# Patient Record
Sex: MALE | Race: WHITE | NOT HISPANIC OR LATINO
[De-identification: names, ages, dates, MRNs, and addresses within clinical notes are randomized per-mention and may not be internally consistent; named-entity substitution may affect disease eponyms.]

---

## 2023-05-11 PROBLEM — Z00.00 ENCOUNTER FOR PREVENTIVE HEALTH EXAMINATION: Status: ACTIVE | Noted: 2023-05-11

## 2023-05-18 ENCOUNTER — APPOINTMENT (OUTPATIENT)
Dept: PLASTIC SURGERY | Facility: CLINIC | Age: 33
End: 2023-05-18
Payer: COMMERCIAL

## 2023-05-18 VITALS
DIASTOLIC BLOOD PRESSURE: 102 MMHG | HEART RATE: 76 BPM | HEIGHT: 60.63 IN | BODY MASS INDEX: 29.84 KG/M2 | OXYGEN SATURATION: 95 % | SYSTOLIC BLOOD PRESSURE: 146 MMHG | RESPIRATION RATE: 16 BRPM | WEIGHT: 156 LBS

## 2023-05-18 DIAGNOSIS — F64.0 TRANSSEXUALISM: ICD-10-CM

## 2023-05-18 DIAGNOSIS — F31.70 BIPOLAR DISORDER, CURRENTLY IN REMISSION, MOST RECENT EPISODE UNSPECIFIED: ICD-10-CM

## 2023-05-18 DIAGNOSIS — Z87.890 PERSONAL HISTORY OF SEX REASSIGNMENT: ICD-10-CM

## 2023-05-18 DIAGNOSIS — F64.9 GENDER IDENTITY DISORDER, UNSPECIFIED: ICD-10-CM

## 2023-05-18 DIAGNOSIS — Q54.8: ICD-10-CM

## 2023-05-18 DIAGNOSIS — Z86.59 PERSONAL HISTORY OF OTHER MENTAL AND BEHAVIORAL DISORDERS: ICD-10-CM

## 2023-05-18 DIAGNOSIS — Z78.9 OTHER SPECIFIED HEALTH STATUS: ICD-10-CM

## 2023-05-18 PROCEDURE — 99203 OFFICE O/P NEW LOW 30 MIN: CPT

## 2023-05-20 PROBLEM — Z86.59 HISTORY OF ATTENTION DEFICIT HYPERACTIVITY DISORDER (ADHD): Status: RESOLVED | Noted: 2023-05-18 | Resolved: 2023-05-20

## 2023-05-20 PROBLEM — Q54.8: Status: ACTIVE | Noted: 2023-05-20

## 2023-05-20 PROBLEM — Z78.9 NON-SMOKER: Status: ACTIVE | Noted: 2023-05-18

## 2023-05-20 PROBLEM — Z87.890 STATUS POST GENDER REASSIGNMENT SURGERY: Status: ACTIVE | Noted: 2023-05-20

## 2023-05-20 PROBLEM — F64.9 GENDER DYSPHORIA: Status: RESOLVED | Noted: 2023-05-18 | Resolved: 2023-05-20

## 2023-05-20 PROBLEM — F31.70 HISTORY OF DEPRESSED BIPOLAR DISORDER: Status: RESOLVED | Noted: 2023-05-18 | Resolved: 2023-05-20

## 2023-05-20 PROBLEM — F64.0 GENDER DYSPHORIA IN ADULT: Status: ACTIVE | Noted: 2023-05-20

## 2023-05-20 RX ORDER — DEXTROAMPHETAMINE SACCHARATE, AMPHETAMINE ASPARTATE, DEXTROAMPHETAMINE SULFATE, AND AMPHETAMINE SULFATE 1.25; 1.25; 1.25; 1.25 MG/1; MG/1; MG/1; MG/1
5 TABLET ORAL
Refills: 0 | Status: ACTIVE | COMMUNITY

## 2023-05-20 RX ORDER — VENLAFAXINE HYDROCHLORIDE 150 MG/1
150 TABLET, EXTENDED RELEASE ORAL
Refills: 0 | Status: ACTIVE | COMMUNITY

## 2023-05-20 RX ORDER — CIPROFLOXACIN HYDROCHLORIDE 500 MG/1
500 TABLET, FILM COATED ORAL
Refills: 0 | Status: ACTIVE | COMMUNITY

## 2023-05-20 RX ORDER — TESTOSTERONE CYPIONATE 200 MG/ML
200 INJECTION, SOLUTION INTRAMUSCULAR
Refills: 0 | Status: ACTIVE | COMMUNITY

## 2023-05-20 RX ORDER — DOXYCYCLINE HYCLATE 100 MG/1
100 TABLET ORAL
Refills: 0 | Status: ACTIVE | COMMUNITY

## 2023-05-20 RX ORDER — LAMOTRIGINE 200 MG/1
200 TABLET ORAL
Refills: 0 | Status: ACTIVE | COMMUNITY

## 2023-05-20 NOTE — HISTORY OF PRESENT ILLNESS
[FreeTextEntry1] : 33-year-old man designated female at birth presents for evaluation for revision of phalloplasty.  The patient states he started undergoing staged phalloplasty with Dr. Domenica aBrron at [facility].  He initially underwent metoidioplasty and then underwent MLD phalloplasty.  The patient states that, at last procedure, his expectation was that his urethra would be lengthened, but, instead, an erectile prosthesis was placed.  He does not feel as though the erectile prosthesis is helpful.  He continues to urinate while sitting and has concerns about his urine stream spraying.  He is concerned about lack of sensation in his phallus.  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

## 2023-05-20 NOTE — PHYSICAL EXAM
[de-identified] : NAD.  BMI 29.8 [de-identified] : Normal respiratory effort [de-identified] : Well-healed phallus with hypertrophic scarring at base.  There are 2 palpable erectile prostheses, likely malleable tami, which are not anchored.  The urethral meatus is within the anterior scrotum and without significant visible stenosis.  No open perineal wounds.

## 2023-05-20 NOTE — ASSESSMENT
[FreeTextEntry1] : We reviewed that the patient's existing anatomy makes urethral lengthening extremely challenging, and that this program does not currently have the resources to correct the patient's issue.  He was encouraged to reach out to the customer service team at [facility] to review his experiences to help avoid future patients having the same.  He was also encouraged to seek out other regional providers to discuss his concerns.

## 2025-09-10 ENCOUNTER — TRANSCRIPTION ENCOUNTER (OUTPATIENT)
Age: 35
End: 2025-09-10